# Patient Record
Sex: MALE | Race: BLACK OR AFRICAN AMERICAN | Employment: UNEMPLOYED | URBAN - METROPOLITAN AREA
[De-identification: names, ages, dates, MRNs, and addresses within clinical notes are randomized per-mention and may not be internally consistent; named-entity substitution may affect disease eponyms.]

---

## 2021-12-01 ENCOUNTER — HOSPITAL ENCOUNTER (EMERGENCY)
Age: 41
Discharge: HOME | End: 2021-12-01
Attending: EMERGENCY MEDICINE
Payer: COMMERCIAL

## 2021-12-01 VITALS
DIASTOLIC BLOOD PRESSURE: 92 MMHG | BODY MASS INDEX: 27.62 KG/M2 | TEMPERATURE: 98 F | HEART RATE: 77 BPM | SYSTOLIC BLOOD PRESSURE: 137 MMHG | RESPIRATION RATE: 18 BRPM | OXYGEN SATURATION: 97 % | WEIGHT: 203.93 LBS | HEIGHT: 72 IN

## 2021-12-01 DIAGNOSIS — A64 STD (MALE): Primary | ICD-10-CM

## 2021-12-01 LAB — URINE TRICHOMONAS EVALUATION: NORMAL

## 2021-12-01 PROCEDURE — 87591 N.GONORRHOEAE DNA AMP PROB: CPT

## 2021-12-01 PROCEDURE — 81015 MICROSCOPIC EXAM OF URINE: CPT

## 2021-12-01 PROCEDURE — 87491 CHLMYD TRACH DNA AMP PROBE: CPT

## 2021-12-01 PROCEDURE — 99283 EMERGENCY DEPT VISIT LOW MDM: CPT

## 2021-12-01 PROCEDURE — 6370000000 HC RX 637 (ALT 250 FOR IP): Performed by: EMERGENCY MEDICINE

## 2021-12-01 RX ORDER — DOXYCYCLINE HYCLATE 100 MG
100 TABLET ORAL ONCE
Status: COMPLETED | OUTPATIENT
Start: 2021-12-01 | End: 2021-12-01

## 2021-12-01 RX ORDER — AZITHROMYCIN 500 MG/1
1000 TABLET, FILM COATED ORAL ONCE
Status: COMPLETED | OUTPATIENT
Start: 2021-12-01 | End: 2021-12-01

## 2021-12-01 RX ORDER — DOXYCYCLINE HYCLATE 100 MG
100 TABLET ORAL 2 TIMES DAILY
Qty: 20 TABLET | Refills: 0 | Status: SHIPPED | OUTPATIENT
Start: 2021-12-01 | End: 2021-12-11

## 2021-12-01 RX ORDER — AZITHROMYCIN 500 MG/1
1000 TABLET, FILM COATED ORAL ONCE
Qty: 2 TABLET | Refills: 0 | Status: SHIPPED | OUTPATIENT
Start: 2021-12-01 | End: 2021-12-01

## 2021-12-01 RX ADMIN — DOXYCYCLINE HYCLATE 100 MG: 100 TABLET, COATED ORAL at 12:21

## 2021-12-01 RX ADMIN — AZITHROMYCIN 1000 MG: 500 TABLET, FILM COATED ORAL at 12:21

## 2021-12-01 NOTE — Clinical Note
Rachelle Larkin was seen and treated in our emergency department on 12/1/2021. He may return to work on 12/02/2021. No restrictions     If you have any questions or concerns, please don't hesitate to call.       Ammon Avila RN

## 2021-12-01 NOTE — ED PROVIDER NOTES
[address] [employer]      Pt Name: Rene Estrada  MRN: [id number]  [place] 1980  Date of evaluation: 12/1/2021  Provider: Pedro Diaz DO    CHIEF COMPLAINT  Chief Complaint   Patient presents with    Exposure to STD     pt states to be treated for possible STD       I wore personal protective equipment when I was in the room the entire time. This includes gloves, N95 mask, face shield, and a glove over my stethoscope for protection. HPI  Rene Estrada is a 42 y.o. male who presents with STD exposure. He states his girlfriend has trichomoniasis and chlamydia. He wants treated. He states his had a mild yellow discharge. He denies any dysuria nocturia hematuria. Denies any fever chills. He denies any back pain. Nothing makes it better or worse. He describes it as moderate. REVIEW OF SYSTEMS  All systems negative except as noted in the HPI. Reviewed Nurses' notes and concur. No LMP for male patient. PAST MEDICAL HISTORY  No past medical history on file. FAMILY HISTORY  No family history on file. SOCIAL HISTORY   reports that he has been smoking cigars. He uses smokeless tobacco. He reports current alcohol use. He reports that he does not use drugs. SURGICAL HISTORY  No past surgical history on file. CURRENT MEDICATIONS      ALLERGIES  Allergies   Allergen Reactions    Ampicillin Hives         PHYSICAL EXAM  VITAL SIGNS: BP (!) 137/92   Pulse 77   Temp 98 °F (36.7 °C) (Oral)   Resp 18   Ht 6' (1.829 m)   Wt 203 lb 14.8 oz (92.5 kg)   SpO2 97%   BMI 27.66 kg/m²    Constitutional: Well-developed, well-nourished, appears normal, nontoxic, activity: Resting company on the cart, does not appear ill. He is using his cell phone. Abdomen: Soft, no tender with no distension, no masses, no pulsatile masses, no hepatosplenomegaly, normal bowel sounds. Skin: Warm, Dry, No erythema, No rash.   Back: No tenderness, Full range of motion, No scoliosis. Neurologic: Alert & oriented x 3  Psychiatric: Affect normal, Judgment normal, Mood normal.    LABORATORY  Labs Reviewed   C.TRACHOMATIS N.GONORRHOEAE DNA, URINE   URINE TRICHOMONAS EVALUATION    Narrative:     Performed at:  [facility]  [address] [place], [facility]   Phone [phone]       RADIOLOGY/PROCEDURES  I personally reviewed the images for this case. No orders to display       [facility]  Pertinent Labs reviewed. (See chart for details)    Vitals:    12/01/21 1059   BP: (!) 137/92   Pulse: 77   Resp: 18   Temp: 98 °F (36.7 °C)   TempSrc: Oral   SpO2: 97%   Weight: 203 lb 14.8 oz (92.5 kg)   Height: 6' (1.829 m)       Medications   azithromycin (ZITHROMAX) tablet 1,000 mg (has no administration in time range)   doxycycline hyclate (VIBRA-TABS) tablet 100 mg (has no administration in time range)       New Prescriptions    AZITHROMYCIN (ZITHROMAX) 500 MG TABLET    Take 2 tablets by mouth once for 1 dose Take at bedtime tonight. DOXYCYCLINE HYCLATE (VIBRA-TABS) 100 MG TABLET    Take 1 tablet by mouth 2 times daily for 10 days       Patient remained stable in the ED. urine was negative for trichomoniasis. Therefore, patient was discharged with prescription for Zithromax due to his allergy for ampicillin. He was also prescribed doxycycline. He was instructed to follow-up in the STD clinics in 1 week and return if any problems. He was instructed to take Tylenol or Advil for pain. The patient's blood pressure was found to be elevated according to CMS/Medicare and the Affordable Care Act/ObamaCare criteria. Elevated blood pressure could occur because of pain or anxiety or other reasons and does not mean that they need to have their blood pressure treated or medications otherwise adjusted.  However, this could also be a sign that they will need to have their blood pressure treated or medications changed. The patient was instructed to follow up closely with their personal physician to have their blood pressure rechecked. The patient was instructed to take a list of recent blood pressure readings to their next visit with their personal physician. See discharge instructions for specific medications, discharge information, and treatments. They were verbally instructed to return to emergency if any problems. (This chart has been completed using [address]. Although attempts have been made to ensure accuracy, words and/or phrases may not be transcribed as intended.)    Patient refused pain medicines at the time of his exam.    IMPRESSION(S):  1. STD (male)        ? Recheck Times: 2370    Diagnostic considerations include but are not limited to:    Chlamydia/Gonorrhea that could cause Epididymitis, Epididymo-orchitis, Prostatitis, or even a Marks syndrome from Chlamydia, GC arthritis, Trichomonas, Herpes genitalia, Venereal Warts (condyloma acuminata),  Syphilis (Primary-a painless hard chancre after an incubation period of 2-12 weeks, secondary-6-8 weeks after the appearance of the initial chancre, latent-asymptomatic phase, then tertiary which present as Gummas in any organ: 1-10 years after initial infection, Cardiovascular: 10-40 years after initial infection, Neurosyphilis: 5-35 years after infection),   HIV/AIDS (and the many other sequelae of this disease),   Chancroid (Haemophilus ducreyi), Lymphogranuloma venereum or LGV (from Chlamydia trachomatis, Relatively rare in the [place], causing the infamous [pseudo]\"Bubo\"), Granuloma inguinale (from Klebsiella granulomatis, also called lupoid ulceration granuloma of the pudenda and granuloma contagiosa (Extremely rare in the [place]).          Lexi Barber,   12/01/21 1157

## 2021-12-02 LAB
C. TRACHOMATIS DNA ,URINE: NEGATIVE
N. GONORRHOEAE DNA, URINE: POSITIVE

## 2021-12-03 ENCOUNTER — HOSPITAL ENCOUNTER (EMERGENCY)
Age: 41
Discharge: HOME | End: 2021-12-03
Payer: COMMERCIAL

## 2021-12-03 ENCOUNTER — HOSPITAL ENCOUNTER (EMERGENCY)
Age: 41
End: 2021-12-03
Payer: COMMERCIAL

## 2021-12-03 VITALS
RESPIRATION RATE: 18 BRPM | TEMPERATURE: 97.9 F | BODY MASS INDEX: 30.01 KG/M2 | SYSTOLIC BLOOD PRESSURE: 119 MMHG | OXYGEN SATURATION: 98 % | HEART RATE: 68 BPM | DIASTOLIC BLOOD PRESSURE: 81 MMHG | WEIGHT: 202.6 LBS | HEIGHT: 69 IN

## 2021-12-03 DIAGNOSIS — A54.9 GONORRHEA IN MALE: Primary | ICD-10-CM

## 2021-12-03 PROCEDURE — 6360000002 HC RX W HCPCS: Performed by: PHYSICIAN ASSISTANT

## 2021-12-03 RX ORDER — CEFTRIAXONE 500 MG/1
500 INJECTION, POWDER, FOR SOLUTION INTRAMUSCULAR; INTRAVENOUS ONCE
Status: COMPLETED | OUTPATIENT
Start: 2021-12-03 | End: 2021-12-03

## 2021-12-03 RX ADMIN — CEFTRIAXONE SODIUM 500 MG: 500 INJECTION, POWDER, FOR SOLUTION INTRAMUSCULAR; INTRAVENOUS at 16:45

## 2021-12-03 ASSESSMENT — ENCOUNTER SYMPTOMS
FACIAL SWELLING: 0
CHOKING: 0
APNEA: 0
NAUSEA: 0
VOMITING: 0
ABDOMINAL PAIN: 0
SHORTNESS OF BREATH: 0
EYE REDNESS: 0
BACK PAIN: 0
EYE DISCHARGE: 0

## 2021-12-03 NOTE — ED NOTES
seen here 12/1/21. pt had been exposed to trichomonas and chlamydia and had requested treatment of this on 12/1/21. pt was called today and told to return for antibiotic shot for gonorrhea treatment. no penile discharge. safe sex teaching with pt. STD teaching with pt.      Chloe Boyd RN  12/03/21 2960

## 2021-12-03 NOTE — ED NOTES
Ready to go home. Able to print AVS now. Discharge instructions with pt. Encouraged to finish doxycycline rx. Safe sex teaching.   No pain     Rosalie Michael RN  12/03/21 0117

## 2021-12-03 NOTE — ED NOTES
Pt's Epic chart accidentally was cancelled. Registration lead at [facility] still trying to fix . Pt sitting on chair playing on his phone. No rash, hives or problem since rocephin shot given.         Dimas Valdovinos RN  12/03/21 8565

## 2021-12-03 NOTE — ED PROVIDER NOTES
**ADVANCED PRACTICE PROVIDER, I HAVE EVALUATED THIS PATIENT**        [facility] ENCOUNTER      Pt Name: Willam Evans  DNJ:[id number]  [place] 1980  Date of evaluation: 12/3/2021  Provider: Narcsia Nieves PA-C      Chief Complaint:    Chief Complaint   Patient presents with    Exposure to STD         Nursing Notes, Past Medical Hx, Past Surgical Hx, Social Hx, Allergies, and Family Hx were all reviewed and agreed with or any disagreements were addressed in the HPI.    HPI: (Location, Duration, Timing, Severity, Quality, Assoc Sx, Context, Modifying factors)    This is a  42 y.o. male who presents to the emergency room because he was called in because he had a positive gonorrhea test.  He was given doxycycline and azithromycin for home. He did start taking those. He denies any penile discharge at this time. No pain with urination. No rash. No lesions on his penis. No other complaints. No other complaints. PastMedical/Surgical History:  History reviewed. No pertinent past medical history. History reviewed. No pertinent surgical history. Medications:  Previous Medications    DOXYCYCLINE HYCLATE (VIBRA-TABS) 100 MG TABLET    Take 1 tablet by mouth 2 times daily for 10 days         Review of Systems:  (2-9 systems needed)  Review of Systems   Constitutional: Negative for chills and fever. HENT: Negative for congestion, facial swelling and sneezing. Eyes: Negative for discharge and redness. Respiratory: Negative for apnea, choking and shortness of breath. Cardiovascular: Negative for chest pain. Gastrointestinal: Negative for abdominal pain, nausea and vomiting. Genitourinary: Negative for dysuria. Musculoskeletal: Negative for back pain, neck pain and neck stiffness. Neurological: Negative for dizziness, tremors, seizures and headaches. All other systems reviewed and are negative.       \"Positives and Pertinent negatives as per HPI\"    Physical Exam:  Physical Exam  Vitals and nursing note reviewed. Cardiovascular:      Rate and Rhythm: Normal rate and regular rhythm. Heart sounds: Normal heart sounds. No murmur heard. No friction rub. No gallop. Pulmonary:      Effort: Pulmonary effort is normal. No respiratory distress. Breath sounds: Normal breath sounds. No wheezing or rales. Chest:      Chest wall: No tenderness. MEDICAL DECISION MAKING    Vitals:    Vitals:    12/03/21 1613   BP: 126/83   Pulse: 63   Resp: 18   Temp: 97.9 °F (36.6 °C)   TempSrc: Oral   SpO2: 98%   Weight: 202 lb 9.6 oz (91.9 kg)   Height: 5' 9\" (1.753 m)       LABS:Labs Reviewed - No data to display     Remainder of labs reviewed and were negative at this time or not returned at the time of this note. RADIOLOGY:   Non-plain film images such as CT, Ultrasound and MRI are read by the radiologist. Chrissie Chang PA-C have directly visualized the radiologic plain film image(s) with the below findings:      Interpretation per the Radiologist below, if available at the time of this note:    No orders to display        No results found. MEDICAL DECISION MAKING / ED COURSE:      PROCEDURES:   Procedures    None    Patient was given:  Medications   cefTRIAXone (ROCEPHIN) injection 500 mg (has no administration in time range)     Emergency room course: Patient on exam throat is clear nonerythematous no exudate. Cardiovascular regular rate rhythm, lungs are clear. No wheeze rales or rhonchi. Abdomen is soft nontender. Patient will be given Rocephin here 500 mg IM. Patient was instructed to continue the doxycycline until completed. Refrain from sexual activity for 2 weeks. Make sure all sexual partners are notified and treated appropriately. Practice safe sex and use condoms. The patient tolerated their visit well. I evaluated the patient. The physician was available for consultation as needed.   The patient and / or the family were informed of the results of any tests, a time was given to answer questions, a plan was proposed and they agreed with plan. CLINICAL IMPRESSION:  1.  Gonorrhea in male        DISPOSITION Decision To Discharge 12/03/2021 04:34:45 PM      PATIENT REFERRED TO:  [address] [address] [facility] [address]  [phone]  Call   As needed      DISCHARGE MEDICATIONS:  New Prescriptions    No medications on file       DISCONTINUED MEDICATIONS:  Discontinued Medications    No medications on file              (Please note the MDM and HPI sections of this note were completed with a voice recognition program.  Efforts were made to edit the dictations but occasionally words are mis-transcribed.)    Electronically signed, Helene Kayser, PA-C,          Helene Kayser, PA-C  12/03/21 4045

## 2021-12-03 NOTE — RESULT ENCOUNTER NOTE
Patient's positive result has been appropriately evaluated by the provider pool. Patient was contacted and notified of the change in treatment plan.     Instructed to return to ED for treatment    Pharmacy: No Pharmacies Listed  Phone number:   Pharmacist receiving the prescription:

## 2021-12-07 PROCEDURE — 96372 THER/PROPH/DIAG INJ SC/IM: CPT

## 2021-12-07 PROCEDURE — 99283 EMERGENCY DEPT VISIT LOW MDM: CPT

## 2021-12-08 NOTE — ED NOTES
Explained importance of having a family doctor for regular medical care. Explained how to get a family doctor. [place] clinic info sheet given. [facility] clinic list given. [facility] referral line given. THAIS Hernández , phone number listed in case pt needs any further assistance.      [facility] STD clinic address and phone number given     Tanya Fournier RN  12/08/21 8081